# Patient Record
Sex: MALE | Race: WHITE
[De-identification: names, ages, dates, MRNs, and addresses within clinical notes are randomized per-mention and may not be internally consistent; named-entity substitution may affect disease eponyms.]

---

## 2017-05-20 ENCOUNTER — HOSPITAL ENCOUNTER (INPATIENT)
Dept: HOSPITAL 8 - ED | Age: 67
LOS: 6 days | Discharge: HOME | DRG: 853 | End: 2017-05-26
Attending: INTERNAL MEDICINE | Admitting: INTERNAL MEDICINE
Payer: MEDICARE

## 2017-05-20 VITALS — DIASTOLIC BLOOD PRESSURE: 86 MMHG | SYSTOLIC BLOOD PRESSURE: 138 MMHG

## 2017-05-20 VITALS — BODY MASS INDEX: 33.18 KG/M2 | HEIGHT: 72 IN | WEIGHT: 244.93 LBS

## 2017-05-20 VITALS — SYSTOLIC BLOOD PRESSURE: 142 MMHG | DIASTOLIC BLOOD PRESSURE: 85 MMHG

## 2017-05-20 DIAGNOSIS — N40.0: ICD-10-CM

## 2017-05-20 DIAGNOSIS — K21.9: ICD-10-CM

## 2017-05-20 DIAGNOSIS — M47.812: ICD-10-CM

## 2017-05-20 DIAGNOSIS — F17.200: ICD-10-CM

## 2017-05-20 DIAGNOSIS — Z87.442: ICD-10-CM

## 2017-05-20 DIAGNOSIS — E78.5: ICD-10-CM

## 2017-05-20 DIAGNOSIS — E43: ICD-10-CM

## 2017-05-20 DIAGNOSIS — Z79.899: ICD-10-CM

## 2017-05-20 DIAGNOSIS — G89.29: ICD-10-CM

## 2017-05-20 DIAGNOSIS — E78.00: ICD-10-CM

## 2017-05-20 DIAGNOSIS — I10: ICD-10-CM

## 2017-05-20 DIAGNOSIS — N39.0: ICD-10-CM

## 2017-05-20 DIAGNOSIS — M54.5: ICD-10-CM

## 2017-05-20 DIAGNOSIS — M50.30: ICD-10-CM

## 2017-05-20 DIAGNOSIS — E66.9: ICD-10-CM

## 2017-05-20 DIAGNOSIS — L02.11: ICD-10-CM

## 2017-05-20 DIAGNOSIS — D63.8: ICD-10-CM

## 2017-05-20 DIAGNOSIS — Z88.0: ICD-10-CM

## 2017-05-20 DIAGNOSIS — G62.9: ICD-10-CM

## 2017-05-20 DIAGNOSIS — A41.01: Primary | ICD-10-CM

## 2017-05-20 LAB
AST SERPL-CCNC: 11 U/L (ref 15–37)
BUN SERPL-MCNC: 16 MG/DL (ref 7–18)

## 2017-05-20 PROCEDURE — 85610 PROTHROMBIN TIME: CPT

## 2017-05-20 PROCEDURE — 87040 BLOOD CULTURE FOR BACTERIA: CPT

## 2017-05-20 PROCEDURE — 87086 URINE CULTURE/COLONY COUNT: CPT

## 2017-05-20 PROCEDURE — 87205 SMEAR GRAM STAIN: CPT

## 2017-05-20 PROCEDURE — 87070 CULTURE OTHR SPECIMN AEROBIC: CPT

## 2017-05-20 PROCEDURE — 80048 BASIC METABOLIC PNL TOTAL CA: CPT

## 2017-05-20 PROCEDURE — 81001 URINALYSIS AUTO W/SCOPE: CPT

## 2017-05-20 PROCEDURE — 87186 SC STD MICRODIL/AGAR DIL: CPT

## 2017-05-20 PROCEDURE — 36569 INSJ PICC 5 YR+ W/O IMAGING: CPT

## 2017-05-20 PROCEDURE — 36415 COLL VENOUS BLD VENIPUNCTURE: CPT

## 2017-05-20 PROCEDURE — 86140 C-REACTIVE PROTEIN: CPT

## 2017-05-20 PROCEDURE — 84443 ASSAY THYROID STIM HORMONE: CPT

## 2017-05-20 PROCEDURE — 87075 CULTR BACTERIA EXCEPT BLOOD: CPT

## 2017-05-20 PROCEDURE — 99285 EMERGENCY DEPT VISIT HI MDM: CPT

## 2017-05-20 PROCEDURE — C1751 CATH, INF, PER/CENT/MIDLINE: HCPCS

## 2017-05-20 PROCEDURE — 76937 US GUIDE VASCULAR ACCESS: CPT

## 2017-05-20 PROCEDURE — 74000: CPT

## 2017-05-20 PROCEDURE — 0W960ZZ DRAINAGE OF NECK, OPEN APPROACH: ICD-10-PCS | Performed by: OTOLARYNGOLOGY

## 2017-05-20 PROCEDURE — 77001 FLUOROGUIDE FOR VEIN DEVICE: CPT

## 2017-05-20 PROCEDURE — 93306 TTE W/DOPPLER COMPLETE: CPT

## 2017-05-20 PROCEDURE — 85025 COMPLETE CBC W/AUTO DIFF WBC: CPT

## 2017-05-20 PROCEDURE — 80053 COMPREHEN METABOLIC PANEL: CPT

## 2017-05-20 PROCEDURE — 85651 RBC SED RATE NONAUTOMATED: CPT

## 2017-05-20 PROCEDURE — 87077 CULTURE AEROBIC IDENTIFY: CPT

## 2017-05-20 PROCEDURE — 87150 DNA/RNA AMPLIFIED PROBE: CPT

## 2017-05-20 RX ADMIN — METRONIDAZOLE SCH MLS/HR: 500 INJECTION, SOLUTION INTRAVENOUS at 15:58

## 2017-05-20 RX ADMIN — GABAPENTIN SCH MG: 100 CAPSULE ORAL at 23:11

## 2017-05-20 RX ADMIN — HYDROMORPHONE HYDROCHLORIDE PRN MG: 1 INJECTION, SOLUTION INTRAMUSCULAR; INTRAVENOUS; SUBCUTANEOUS at 14:58

## 2017-05-20 RX ADMIN — ENOXAPARIN SODIUM SCH MG: 40 INJECTION SUBCUTANEOUS at 23:13

## 2017-05-20 RX ADMIN — METOPROLOL TARTRATE SCH MG: 25 TABLET, FILM COATED ORAL at 17:56

## 2017-05-20 RX ADMIN — LISINOPRIL SCH MG: 20 TABLET ORAL at 09:00

## 2017-05-20 RX ADMIN — HYDROMORPHONE HYDROCHLORIDE PRN MG: 1 INJECTION, SOLUTION INTRAMUSCULAR; INTRAVENOUS; SUBCUTANEOUS at 18:42

## 2017-05-20 RX ADMIN — DIAZEPAM SCH MG: 10 TABLET ORAL at 12:43

## 2017-05-20 RX ADMIN — DUTASTERIDE SCH MG: 0.5 CAPSULE ORAL at 13:45

## 2017-05-20 RX ADMIN — EZETIMIBE SCH MG: 10 TABLET ORAL at 13:44

## 2017-05-20 RX ADMIN — TAMSULOSIN HYDROCHLORIDE SCH MG: 0.4 CAPSULE ORAL at 13:44

## 2017-05-20 RX ADMIN — CLINDAMYCIN IN 5 PERCENT DEXTROSE SCH MLS/HR: 18 INJECTION, SOLUTION INTRAVENOUS at 13:44

## 2017-05-20 RX ADMIN — METOPROLOL TARTRATE SCH MG: 25 TABLET, FILM COATED ORAL at 09:00

## 2017-05-20 RX ADMIN — DIAZEPAM SCH MG: 10 TABLET ORAL at 23:12

## 2017-05-20 RX ADMIN — BISACODYL PRN MG: 10 SUPPOSITORY RECTAL at 23:15

## 2017-05-20 RX ADMIN — HYDROMORPHONE HYDROCHLORIDE PRN MG: 1 INJECTION, SOLUTION INTRAMUSCULAR; INTRAVENOUS; SUBCUTANEOUS at 13:04

## 2017-05-20 RX ADMIN — METRONIDAZOLE SCH MLS/HR: 500 INJECTION, SOLUTION INTRAVENOUS at 08:30

## 2017-05-20 RX ADMIN — GABAPENTIN SCH MG: 100 CAPSULE ORAL at 13:44

## 2017-05-20 RX ADMIN — GABAPENTIN SCH MG: 100 CAPSULE ORAL at 15:58

## 2017-05-20 RX ADMIN — PANTOPRAZOLE SODIUM SCH MG: 40 TABLET, DELAYED RELEASE ORAL at 13:45

## 2017-05-20 RX ADMIN — KETOROLAC TROMETHAMINE PRN MG: 30 INJECTION, SOLUTION INTRAMUSCULAR at 20:53

## 2017-05-20 RX ADMIN — CEFTRIAXONE SCH MLS/HR: 2 INJECTION, POWDER, FOR SOLUTION INTRAMUSCULAR; INTRAVENOUS at 08:30

## 2017-05-20 RX ADMIN — SODIUM CHLORIDE SCH MLS/HR: 0.9 INJECTION, SOLUTION INTRAVENOUS at 12:43

## 2017-05-20 RX ADMIN — CLINDAMYCIN IN 5 PERCENT DEXTROSE SCH MLS/HR: 18 INJECTION, SOLUTION INTRAVENOUS at 19:45

## 2017-05-20 RX ADMIN — CEFTRIAXONE SCH MLS/HR: 2 INJECTION, POWDER, FOR SOLUTION INTRAMUSCULAR; INTRAVENOUS at 20:53

## 2017-05-21 VITALS — DIASTOLIC BLOOD PRESSURE: 68 MMHG | SYSTOLIC BLOOD PRESSURE: 112 MMHG

## 2017-05-21 VITALS — DIASTOLIC BLOOD PRESSURE: 58 MMHG | SYSTOLIC BLOOD PRESSURE: 97 MMHG

## 2017-05-21 VITALS — SYSTOLIC BLOOD PRESSURE: 115 MMHG | DIASTOLIC BLOOD PRESSURE: 79 MMHG

## 2017-05-21 VITALS — SYSTOLIC BLOOD PRESSURE: 110 MMHG | DIASTOLIC BLOOD PRESSURE: 64 MMHG

## 2017-05-21 VITALS — DIASTOLIC BLOOD PRESSURE: 60 MMHG | SYSTOLIC BLOOD PRESSURE: 100 MMHG

## 2017-05-21 LAB
AST SERPL-CCNC: 12 U/L (ref 15–37)
BUN SERPL-MCNC: 16 MG/DL (ref 7–18)
PATH.CAST-FLAG: (no result)
SPERM-FLAG: (no result)
SRC-FLAG: (no result)
XTAL-FLAG: (no result)
YLC-FLAG: (no result)

## 2017-05-21 RX ADMIN — METOPROLOL TARTRATE SCH MG: 25 TABLET, FILM COATED ORAL at 06:14

## 2017-05-21 RX ADMIN — TEMAZEPAM PRN MG: 15 CAPSULE ORAL at 21:58

## 2017-05-21 RX ADMIN — CLINDAMYCIN IN 5 PERCENT DEXTROSE SCH MLS/HR: 18 INJECTION, SOLUTION INTRAVENOUS at 21:00

## 2017-05-21 RX ADMIN — VANCOMYCIN HYDROCHLORIDE SCH MLS/HR: 1 INJECTION, POWDER, LYOPHILIZED, FOR SOLUTION INTRAVENOUS at 04:51

## 2017-05-21 RX ADMIN — DIAZEPAM SCH MG: 10 TABLET ORAL at 00:53

## 2017-05-21 RX ADMIN — ENOXAPARIN SODIUM SCH MG: 40 INJECTION SUBCUTANEOUS at 23:00

## 2017-05-21 RX ADMIN — SODIUM CHLORIDE SCH MLS/HR: 0.9 INJECTION, SOLUTION INTRAVENOUS at 02:15

## 2017-05-21 RX ADMIN — VANCOMYCIN HYDROCHLORIDE SCH MLS/HR: 1 INJECTION, POWDER, LYOPHILIZED, FOR SOLUTION INTRAVENOUS at 21:54

## 2017-05-21 RX ADMIN — CEFTRIAXONE SCH MLS/HR: 2 INJECTION, POWDER, FOR SOLUTION INTRAMUSCULAR; INTRAVENOUS at 08:30

## 2017-05-21 RX ADMIN — LACTOBACILLUS TAB SCH TAB: TAB at 21:50

## 2017-05-21 RX ADMIN — OXYCODONE HYDROCHLORIDE AND ACETAMINOPHEN PRN TAB: 10; 325 TABLET ORAL at 19:51

## 2017-05-21 RX ADMIN — EZETIMIBE SCH MG: 10 TABLET ORAL at 07:54

## 2017-05-21 RX ADMIN — GABAPENTIN SCH MG: 100 CAPSULE ORAL at 06:14

## 2017-05-21 RX ADMIN — PANTOPRAZOLE SODIUM SCH MG: 40 TABLET, DELAYED RELEASE ORAL at 07:55

## 2017-05-21 RX ADMIN — DIAZEPAM SCH MG: 10 TABLET ORAL at 07:58

## 2017-05-21 RX ADMIN — LISINOPRIL SCH MG: 20 TABLET ORAL at 07:55

## 2017-05-21 RX ADMIN — METOPROLOL TARTRATE SCH MG: 25 TABLET, FILM COATED ORAL at 18:00

## 2017-05-21 RX ADMIN — OXYCODONE HYDROCHLORIDE AND ACETAMINOPHEN PRN TAB: 10; 325 TABLET ORAL at 08:03

## 2017-05-21 RX ADMIN — GABAPENTIN SCH MG: 300 CAPSULE ORAL at 15:58

## 2017-05-21 RX ADMIN — LACTOBACILLUS TAB SCH TAB: TAB at 15:58

## 2017-05-21 RX ADMIN — SODIUM CHLORIDE SCH MLS/HR: 0.9 INJECTION, SOLUTION INTRAVENOUS at 21:00

## 2017-05-21 RX ADMIN — CLINDAMYCIN IN 5 PERCENT DEXTROSE SCH MLS/HR: 18 INJECTION, SOLUTION INTRAVENOUS at 02:15

## 2017-05-21 RX ADMIN — GABAPENTIN SCH MG: 300 CAPSULE ORAL at 20:06

## 2017-05-21 RX ADMIN — OXYCODONE HYDROCHLORIDE AND ACETAMINOPHEN PRN TAB: 10; 325 TABLET ORAL at 23:52

## 2017-05-21 RX ADMIN — METRONIDAZOLE SCH MLS/HR: 500 INJECTION, SOLUTION INTRAVENOUS at 10:35

## 2017-05-21 RX ADMIN — DIAZEPAM PRN MG: 10 TABLET ORAL at 21:50

## 2017-05-21 RX ADMIN — LACTOBACILLUS TAB SCH TAB: TAB at 11:42

## 2017-05-21 RX ADMIN — BACITRACIN ZINC AND POLYMYXIN B SULFATE SCH APPLIC: 500; 10000 OINTMENT TOPICAL at 11:42

## 2017-05-21 RX ADMIN — CLINDAMYCIN IN 5 PERCENT DEXTROSE SCH MLS/HR: 18 INJECTION, SOLUTION INTRAVENOUS at 14:57

## 2017-05-21 RX ADMIN — TAMSULOSIN HYDROCHLORIDE SCH MG: 0.4 CAPSULE ORAL at 07:55

## 2017-05-21 RX ADMIN — CLINDAMYCIN IN 5 PERCENT DEXTROSE SCH MLS/HR: 18 INJECTION, SOLUTION INTRAVENOUS at 08:45

## 2017-05-21 RX ADMIN — OXYCODONE HYDROCHLORIDE AND ACETAMINOPHEN PRN TAB: 10; 325 TABLET ORAL at 15:57

## 2017-05-21 RX ADMIN — DIAZEPAM PRN MG: 10 TABLET ORAL at 15:57

## 2017-05-21 RX ADMIN — METRONIDAZOLE SCH MLS/HR: 500 INJECTION, SOLUTION INTRAVENOUS at 00:30

## 2017-05-21 RX ADMIN — DUTASTERIDE SCH MG: 0.5 CAPSULE ORAL at 07:54

## 2017-05-21 RX ADMIN — TEMAZEPAM PRN MG: 15 CAPSULE ORAL at 03:56

## 2017-05-21 RX ADMIN — OXYCODONE HYDROCHLORIDE AND ACETAMINOPHEN PRN TAB: 10; 325 TABLET ORAL at 11:58

## 2017-05-21 RX ADMIN — GABAPENTIN SCH MG: 100 CAPSULE ORAL at 10:35

## 2017-05-22 VITALS — SYSTOLIC BLOOD PRESSURE: 124 MMHG | DIASTOLIC BLOOD PRESSURE: 86 MMHG

## 2017-05-22 VITALS — DIASTOLIC BLOOD PRESSURE: 70 MMHG | SYSTOLIC BLOOD PRESSURE: 119 MMHG

## 2017-05-22 VITALS — SYSTOLIC BLOOD PRESSURE: 140 MMHG | DIASTOLIC BLOOD PRESSURE: 83 MMHG

## 2017-05-22 VITALS — DIASTOLIC BLOOD PRESSURE: 70 MMHG | SYSTOLIC BLOOD PRESSURE: 116 MMHG

## 2017-05-22 LAB — BUN SERPL-MCNC: 21 MG/DL (ref 7–18)

## 2017-05-22 RX ADMIN — OXYCODONE HYDROCHLORIDE AND ACETAMINOPHEN PRN TAB: 10; 325 TABLET ORAL at 08:50

## 2017-05-22 RX ADMIN — GABAPENTIN SCH MG: 300 CAPSULE ORAL at 17:28

## 2017-05-22 RX ADMIN — TAMSULOSIN HYDROCHLORIDE SCH MG: 0.4 CAPSULE ORAL at 08:51

## 2017-05-22 RX ADMIN — LISINOPRIL SCH MG: 20 TABLET ORAL at 08:52

## 2017-05-22 RX ADMIN — CLINDAMYCIN IN 5 PERCENT DEXTROSE SCH MLS/HR: 18 INJECTION, SOLUTION INTRAVENOUS at 03:06

## 2017-05-22 RX ADMIN — CEFAZOLIN SCH MLS/HR: 1 INJECTION, POWDER, FOR SOLUTION INTRAVENOUS at 14:58

## 2017-05-22 RX ADMIN — METOPROLOL TARTRATE SCH MG: 25 TABLET, FILM COATED ORAL at 18:05

## 2017-05-22 RX ADMIN — CLINDAMYCIN IN 5 PERCENT DEXTROSE SCH MLS/HR: 18 INJECTION, SOLUTION INTRAVENOUS at 08:54

## 2017-05-22 RX ADMIN — DIAZEPAM PRN MG: 10 TABLET ORAL at 19:38

## 2017-05-22 RX ADMIN — HYDROMORPHONE HYDROCHLORIDE PRN MG: 1 INJECTION, SOLUTION INTRAMUSCULAR; INTRAVENOUS; SUBCUTANEOUS at 21:49

## 2017-05-22 RX ADMIN — DUTASTERIDE SCH MG: 0.5 CAPSULE ORAL at 08:51

## 2017-05-22 RX ADMIN — HYDROMORPHONE HYDROCHLORIDE PRN MG: 1 INJECTION, SOLUTION INTRAMUSCULAR; INTRAVENOUS; SUBCUTANEOUS at 19:39

## 2017-05-22 RX ADMIN — GABAPENTIN SCH MG: 300 CAPSULE ORAL at 06:00

## 2017-05-22 RX ADMIN — DIAZEPAM PRN MG: 10 TABLET ORAL at 10:48

## 2017-05-22 RX ADMIN — CEFAZOLIN SCH MLS/HR: 1 INJECTION, POWDER, FOR SOLUTION INTRAVENOUS at 21:50

## 2017-05-22 RX ADMIN — SODIUM CHLORIDE SCH MLS/HR: 0.9 INJECTION, SOLUTION INTRAVENOUS at 17:28

## 2017-05-22 RX ADMIN — GABAPENTIN SCH MG: 300 CAPSULE ORAL at 10:48

## 2017-05-22 RX ADMIN — KETOROLAC TROMETHAMINE PRN MG: 30 INJECTION, SOLUTION INTRAMUSCULAR at 21:49

## 2017-05-22 RX ADMIN — EZETIMIBE SCH MG: 10 TABLET ORAL at 08:52

## 2017-05-22 RX ADMIN — ENOXAPARIN SODIUM SCH MG: 40 INJECTION SUBCUTANEOUS at 21:52

## 2017-05-22 RX ADMIN — TEMAZEPAM PRN MG: 15 CAPSULE ORAL at 22:16

## 2017-05-22 RX ADMIN — LACTOBACILLUS TAB SCH TAB: TAB at 08:51

## 2017-05-22 RX ADMIN — OXYCODONE HYDROCHLORIDE AND ACETAMINOPHEN PRN TAB: 10; 325 TABLET ORAL at 23:55

## 2017-05-22 RX ADMIN — OXYCODONE HYDROCHLORIDE AND ACETAMINOPHEN PRN TAB: 10; 325 TABLET ORAL at 04:17

## 2017-05-22 RX ADMIN — GABAPENTIN SCH MG: 300 CAPSULE ORAL at 21:52

## 2017-05-22 RX ADMIN — LACTOBACILLUS TAB SCH TAB: TAB at 21:52

## 2017-05-22 RX ADMIN — METOPROLOL TARTRATE SCH MG: 25 TABLET, FILM COATED ORAL at 06:18

## 2017-05-22 RX ADMIN — PANTOPRAZOLE SODIUM SCH MG: 40 TABLET, DELAYED RELEASE ORAL at 08:52

## 2017-05-22 RX ADMIN — DIAZEPAM PRN MG: 10 TABLET ORAL at 04:18

## 2017-05-22 RX ADMIN — LACTOBACILLUS TAB SCH TAB: TAB at 17:28

## 2017-05-23 VITALS — DIASTOLIC BLOOD PRESSURE: 80 MMHG | SYSTOLIC BLOOD PRESSURE: 140 MMHG

## 2017-05-23 VITALS — DIASTOLIC BLOOD PRESSURE: 85 MMHG | SYSTOLIC BLOOD PRESSURE: 146 MMHG

## 2017-05-23 VITALS — DIASTOLIC BLOOD PRESSURE: 84 MMHG | SYSTOLIC BLOOD PRESSURE: 132 MMHG

## 2017-05-23 LAB — BUN SERPL-MCNC: 17 MG/DL (ref 7–18)

## 2017-05-23 RX ADMIN — METOPROLOL TARTRATE SCH MG: 25 TABLET, FILM COATED ORAL at 06:46

## 2017-05-23 RX ADMIN — PANTOPRAZOLE SODIUM SCH MG: 40 TABLET, DELAYED RELEASE ORAL at 09:00

## 2017-05-23 RX ADMIN — CEFAZOLIN SCH MLS/HR: 1 INJECTION, POWDER, FOR SOLUTION INTRAVENOUS at 22:18

## 2017-05-23 RX ADMIN — BISACODYL PRN MG: 10 SUPPOSITORY RECTAL at 09:13

## 2017-05-23 RX ADMIN — OXYCODONE HYDROCHLORIDE AND ACETAMINOPHEN PRN TAB: 10; 325 TABLET ORAL at 15:50

## 2017-05-23 RX ADMIN — GABAPENTIN SCH MG: 400 CAPSULE ORAL at 20:09

## 2017-05-23 RX ADMIN — DIAZEPAM PRN MG: 10 TABLET ORAL at 17:22

## 2017-05-23 RX ADMIN — DIAZEPAM PRN MG: 10 TABLET ORAL at 22:17

## 2017-05-23 RX ADMIN — EZETIMIBE SCH MG: 10 TABLET ORAL at 09:12

## 2017-05-23 RX ADMIN — LISINOPRIL SCH MG: 20 TABLET ORAL at 09:12

## 2017-05-23 RX ADMIN — LACTOBACILLUS TAB SCH TAB: TAB at 09:12

## 2017-05-23 RX ADMIN — CEFAZOLIN SCH MLS/HR: 1 INJECTION, POWDER, FOR SOLUTION INTRAVENOUS at 06:46

## 2017-05-23 RX ADMIN — ENOXAPARIN SODIUM SCH MG: 40 INJECTION SUBCUTANEOUS at 22:18

## 2017-05-23 RX ADMIN — OXYCODONE HYDROCHLORIDE AND ACETAMINOPHEN PRN TAB: 10; 325 TABLET ORAL at 06:50

## 2017-05-23 RX ADMIN — HYDROMORPHONE HYDROCHLORIDE PRN MG: 1 INJECTION, SOLUTION INTRAMUSCULAR; INTRAVENOUS; SUBCUTANEOUS at 22:31

## 2017-05-23 RX ADMIN — OXYCODONE HYDROCHLORIDE AND ACETAMINOPHEN PRN TAB: 10; 325 TABLET ORAL at 20:09

## 2017-05-23 RX ADMIN — SODIUM CHLORIDE SCH MLS/HR: 0.9 INJECTION, SOLUTION INTRAVENOUS at 06:46

## 2017-05-23 RX ADMIN — SODIUM CHLORIDE SCH MLS/HR: 0.9 INJECTION, SOLUTION INTRAVENOUS at 17:23

## 2017-05-23 RX ADMIN — LACTOBACILLUS TAB SCH TAB: TAB at 20:09

## 2017-05-23 RX ADMIN — GABAPENTIN SCH MG: 300 CAPSULE ORAL at 06:46

## 2017-05-23 RX ADMIN — TAMSULOSIN HYDROCHLORIDE SCH MG: 0.4 CAPSULE ORAL at 09:11

## 2017-05-23 RX ADMIN — GABAPENTIN SCH MG: 300 CAPSULE ORAL at 15:50

## 2017-05-23 RX ADMIN — CEFAZOLIN SCH MLS/HR: 1 INJECTION, POWDER, FOR SOLUTION INTRAVENOUS at 14:40

## 2017-05-23 RX ADMIN — LACTOBACILLUS TAB SCH TAB: TAB at 15:50

## 2017-05-23 RX ADMIN — DIAZEPAM PRN MG: 10 TABLET ORAL at 06:51

## 2017-05-23 RX ADMIN — GABAPENTIN SCH MG: 300 CAPSULE ORAL at 11:00

## 2017-05-23 RX ADMIN — DUTASTERIDE SCH MG: 0.5 CAPSULE ORAL at 09:12

## 2017-05-23 RX ADMIN — METOPROLOL TARTRATE SCH MG: 25 TABLET, FILM COATED ORAL at 17:22

## 2017-05-23 RX ADMIN — BACITRACIN ZINC AND POLYMYXIN B SULFATE SCH APPLIC: 500; 10000 OINTMENT TOPICAL at 12:04

## 2017-05-24 VITALS — DIASTOLIC BLOOD PRESSURE: 67 MMHG | SYSTOLIC BLOOD PRESSURE: 117 MMHG

## 2017-05-24 VITALS — SYSTOLIC BLOOD PRESSURE: 132 MMHG | DIASTOLIC BLOOD PRESSURE: 70 MMHG

## 2017-05-24 VITALS — SYSTOLIC BLOOD PRESSURE: 121 MMHG | DIASTOLIC BLOOD PRESSURE: 75 MMHG

## 2017-05-24 VITALS — SYSTOLIC BLOOD PRESSURE: 134 MMHG | DIASTOLIC BLOOD PRESSURE: 89 MMHG

## 2017-05-24 VITALS — SYSTOLIC BLOOD PRESSURE: 141 MMHG | DIASTOLIC BLOOD PRESSURE: 80 MMHG

## 2017-05-24 RX ADMIN — CEFAZOLIN SODIUM SCH MLS/HR: 2 SOLUTION INTRAVENOUS at 21:03

## 2017-05-24 RX ADMIN — PANTOPRAZOLE SODIUM SCH MG: 40 TABLET, DELAYED RELEASE ORAL at 08:36

## 2017-05-24 RX ADMIN — TAMSULOSIN HYDROCHLORIDE SCH MG: 0.4 CAPSULE ORAL at 08:36

## 2017-05-24 RX ADMIN — OXYCODONE HYDROCHLORIDE AND ACETAMINOPHEN PRN TAB: 10; 325 TABLET ORAL at 05:42

## 2017-05-24 RX ADMIN — EZETIMIBE SCH MG: 10 TABLET ORAL at 08:37

## 2017-05-24 RX ADMIN — METOPROLOL TARTRATE SCH MG: 25 TABLET, FILM COATED ORAL at 17:36

## 2017-05-24 RX ADMIN — GABAPENTIN SCH MG: 400 CAPSULE ORAL at 15:40

## 2017-05-24 RX ADMIN — DIAZEPAM PRN MG: 10 TABLET ORAL at 05:42

## 2017-05-24 RX ADMIN — GABAPENTIN SCH MG: 400 CAPSULE ORAL at 21:03

## 2017-05-24 RX ADMIN — DUTASTERIDE SCH MG: 0.5 CAPSULE ORAL at 08:37

## 2017-05-24 RX ADMIN — GABAPENTIN SCH MG: 400 CAPSULE ORAL at 12:32

## 2017-05-24 RX ADMIN — OXYCODONE HYDROCHLORIDE AND ACETAMINOPHEN PRN TAB: 10; 325 TABLET ORAL at 00:39

## 2017-05-24 RX ADMIN — BACITRACIN ZINC AND POLYMYXIN B SULFATE SCH APPLIC: 500; 10000 OINTMENT TOPICAL at 15:40

## 2017-05-24 RX ADMIN — LISINOPRIL SCH MG: 20 TABLET ORAL at 08:43

## 2017-05-24 RX ADMIN — ENOXAPARIN SODIUM SCH MG: 40 INJECTION SUBCUTANEOUS at 21:04

## 2017-05-24 RX ADMIN — OXYCODONE HYDROCHLORIDE AND ACETAMINOPHEN PRN TAB: 10; 325 TABLET ORAL at 10:44

## 2017-05-24 RX ADMIN — OXYCODONE HYDROCHLORIDE AND ACETAMINOPHEN PRN TAB: 10; 325 TABLET ORAL at 23:50

## 2017-05-24 RX ADMIN — LACTOBACILLUS TAB SCH TAB: TAB at 21:03

## 2017-05-24 RX ADMIN — CEFAZOLIN SCH MLS/HR: 1 INJECTION, POWDER, FOR SOLUTION INTRAVENOUS at 05:41

## 2017-05-24 RX ADMIN — GABAPENTIN SCH MG: 400 CAPSULE ORAL at 05:41

## 2017-05-24 RX ADMIN — LACTOBACILLUS TAB SCH TAB: TAB at 15:40

## 2017-05-24 RX ADMIN — LACTOBACILLUS TAB SCH TAB: TAB at 08:36

## 2017-05-24 RX ADMIN — CEFAZOLIN SODIUM SCH MLS/HR: 2 SOLUTION INTRAVENOUS at 14:16

## 2017-05-24 RX ADMIN — METOPROLOL TARTRATE SCH MG: 25 TABLET, FILM COATED ORAL at 05:42

## 2017-05-24 RX ADMIN — SODIUM CHLORIDE SCH MLS/HR: 0.9 INJECTION, SOLUTION INTRAVENOUS at 15:39

## 2017-05-24 RX ADMIN — SODIUM CHLORIDE SCH MLS/HR: 0.9 INJECTION, SOLUTION INTRAVENOUS at 05:43

## 2017-05-24 RX ADMIN — OXYCODONE HYDROCHLORIDE AND ACETAMINOPHEN PRN TAB: 10; 325 TABLET ORAL at 18:46

## 2017-05-25 VITALS — SYSTOLIC BLOOD PRESSURE: 122 MMHG | DIASTOLIC BLOOD PRESSURE: 75 MMHG

## 2017-05-25 VITALS — SYSTOLIC BLOOD PRESSURE: 133 MMHG | DIASTOLIC BLOOD PRESSURE: 90 MMHG

## 2017-05-25 VITALS — SYSTOLIC BLOOD PRESSURE: 136 MMHG | DIASTOLIC BLOOD PRESSURE: 77 MMHG

## 2017-05-25 VITALS — DIASTOLIC BLOOD PRESSURE: 82 MMHG | SYSTOLIC BLOOD PRESSURE: 120 MMHG

## 2017-05-25 VITALS — SYSTOLIC BLOOD PRESSURE: 138 MMHG | DIASTOLIC BLOOD PRESSURE: 83 MMHG

## 2017-05-25 PROCEDURE — 02HV33Z INSERTION OF INFUSION DEVICE INTO SUPERIOR VENA CAVA, PERCUTANEOUS APPROACH: ICD-10-PCS | Performed by: RADIOLOGY

## 2017-05-25 PROCEDURE — B548ZZA ULTRASONOGRAPHY OF SUPERIOR VENA CAVA, GUIDANCE: ICD-10-PCS | Performed by: RADIOLOGY

## 2017-05-25 PROCEDURE — B5181ZA FLUOROSCOPY OF SUPERIOR VENA CAVA USING LOW OSMOLAR CONTRAST, GUIDANCE: ICD-10-PCS | Performed by: RADIOLOGY

## 2017-05-25 RX ADMIN — CEFAZOLIN SODIUM SCH MLS/HR: 2 SOLUTION INTRAVENOUS at 05:22

## 2017-05-25 RX ADMIN — SODIUM CHLORIDE SCH MLS/HR: 0.9 INJECTION, SOLUTION INTRAVENOUS at 12:00

## 2017-05-25 RX ADMIN — OXYCODONE HYDROCHLORIDE AND ACETAMINOPHEN PRN TAB: 10; 325 TABLET ORAL at 23:39

## 2017-05-25 RX ADMIN — GABAPENTIN SCH MG: 400 CAPSULE ORAL at 11:34

## 2017-05-25 RX ADMIN — SODIUM CHLORIDE SCH MLS/HR: 0.9 INJECTION, SOLUTION INTRAVENOUS at 22:00

## 2017-05-25 RX ADMIN — GABAPENTIN SCH MG: 400 CAPSULE ORAL at 06:36

## 2017-05-25 RX ADMIN — SODIUM CHLORIDE SCH MLS/HR: 0.9 INJECTION, SOLUTION INTRAVENOUS at 05:22

## 2017-05-25 RX ADMIN — LACTOBACILLUS TAB SCH TAB: TAB at 21:17

## 2017-05-25 RX ADMIN — TAMSULOSIN HYDROCHLORIDE SCH MG: 0.4 CAPSULE ORAL at 09:31

## 2017-05-25 RX ADMIN — GABAPENTIN SCH MG: 400 CAPSULE ORAL at 21:17

## 2017-05-25 RX ADMIN — LISINOPRIL SCH MG: 20 TABLET ORAL at 09:32

## 2017-05-25 RX ADMIN — DUTASTERIDE SCH MG: 0.5 CAPSULE ORAL at 09:31

## 2017-05-25 RX ADMIN — HYDROMORPHONE HYDROCHLORIDE PRN MG: 1 INJECTION, SOLUTION INTRAMUSCULAR; INTRAVENOUS; SUBCUTANEOUS at 23:19

## 2017-05-25 RX ADMIN — LACTOBACILLUS TAB SCH TAB: TAB at 09:31

## 2017-05-25 RX ADMIN — METOPROLOL TARTRATE SCH MG: 25 TABLET, FILM COATED ORAL at 17:46

## 2017-05-25 RX ADMIN — ENOXAPARIN SODIUM SCH MG: 40 INJECTION SUBCUTANEOUS at 21:22

## 2017-05-25 RX ADMIN — CEFAZOLIN SODIUM SCH MLS/HR: 2 SOLUTION INTRAVENOUS at 13:37

## 2017-05-25 RX ADMIN — LACTOBACILLUS TAB SCH TAB: TAB at 16:41

## 2017-05-25 RX ADMIN — EZETIMIBE SCH MG: 10 TABLET ORAL at 09:30

## 2017-05-25 RX ADMIN — GABAPENTIN SCH MG: 400 CAPSULE ORAL at 16:40

## 2017-05-25 RX ADMIN — PANTOPRAZOLE SODIUM SCH MG: 40 TABLET, DELAYED RELEASE ORAL at 09:32

## 2017-05-25 RX ADMIN — BACITRACIN ZINC AND POLYMYXIN B SULFATE SCH APPLIC: 500; 10000 OINTMENT TOPICAL at 11:34

## 2017-05-25 RX ADMIN — OXYCODONE HYDROCHLORIDE AND ACETAMINOPHEN PRN TAB: 10; 325 TABLET ORAL at 12:57

## 2017-05-25 RX ADMIN — METOPROLOL TARTRATE SCH MG: 25 TABLET, FILM COATED ORAL at 06:47

## 2017-05-25 RX ADMIN — CEFAZOLIN SODIUM SCH MLS/HR: 2 SOLUTION INTRAVENOUS at 21:17

## 2017-05-25 RX ADMIN — DIAZEPAM PRN MG: 10 TABLET ORAL at 13:36

## 2017-05-25 RX ADMIN — DIAZEPAM PRN MG: 10 TABLET ORAL at 21:27

## 2017-05-25 RX ADMIN — OXYCODONE HYDROCHLORIDE AND ACETAMINOPHEN PRN TAB: 10; 325 TABLET ORAL at 19:42

## 2017-05-26 VITALS — SYSTOLIC BLOOD PRESSURE: 115 MMHG | DIASTOLIC BLOOD PRESSURE: 72 MMHG

## 2017-05-26 VITALS — DIASTOLIC BLOOD PRESSURE: 76 MMHG | SYSTOLIC BLOOD PRESSURE: 117 MMHG

## 2017-05-26 VITALS — DIASTOLIC BLOOD PRESSURE: 88 MMHG | SYSTOLIC BLOOD PRESSURE: 122 MMHG

## 2017-05-26 VITALS — SYSTOLIC BLOOD PRESSURE: 118 MMHG | DIASTOLIC BLOOD PRESSURE: 75 MMHG

## 2017-05-26 RX ADMIN — CEFAZOLIN SODIUM SCH MLS/HR: 2 SOLUTION INTRAVENOUS at 05:49

## 2017-05-26 RX ADMIN — SODIUM CHLORIDE SCH MLS/HR: 0.9 INJECTION, SOLUTION INTRAVENOUS at 07:55

## 2017-05-26 RX ADMIN — LISINOPRIL SCH MG: 20 TABLET ORAL at 08:01

## 2017-05-26 RX ADMIN — BACITRACIN ZINC AND POLYMYXIN B SULFATE SCH APPLIC: 500; 10000 OINTMENT TOPICAL at 07:59

## 2017-05-26 RX ADMIN — OXYCODONE HYDROCHLORIDE AND ACETAMINOPHEN PRN TAB: 10; 325 TABLET ORAL at 07:59

## 2017-05-26 RX ADMIN — PANTOPRAZOLE SODIUM SCH MG: 40 TABLET, DELAYED RELEASE ORAL at 08:02

## 2017-05-26 RX ADMIN — METOPROLOL TARTRATE SCH MG: 25 TABLET, FILM COATED ORAL at 06:19

## 2017-05-26 RX ADMIN — GABAPENTIN SCH MG: 400 CAPSULE ORAL at 06:19

## 2017-05-26 RX ADMIN — EZETIMIBE SCH MG: 10 TABLET ORAL at 08:03

## 2017-05-26 RX ADMIN — DUTASTERIDE SCH MG: 0.5 CAPSULE ORAL at 07:59

## 2017-05-26 RX ADMIN — LACTOBACILLUS TAB SCH TAB: TAB at 08:00

## 2017-05-26 RX ADMIN — TAMSULOSIN HYDROCHLORIDE SCH MG: 0.4 CAPSULE ORAL at 08:00

## 2017-05-26 RX ADMIN — OXYCODONE HYDROCHLORIDE AND ACETAMINOPHEN PRN TAB: 10; 325 TABLET ORAL at 03:44

## 2020-09-29 ENCOUNTER — APPOINTMENT (RX ONLY)
Dept: URBAN - METROPOLITAN AREA CLINIC 38 | Facility: CLINIC | Age: 70
Setting detail: DERMATOLOGY
End: 2020-09-29

## 2020-09-29 DIAGNOSIS — L57.8 OTHER SKIN CHANGES DUE TO CHRONIC EXPOSURE TO NONIONIZING RADIATION: ICD-10-CM

## 2020-09-29 DIAGNOSIS — L81.4 OTHER MELANIN HYPERPIGMENTATION: ICD-10-CM

## 2020-09-29 DIAGNOSIS — L85.3 XEROSIS CUTIS: ICD-10-CM

## 2020-09-29 DIAGNOSIS — Z71.89 OTHER SPECIFIED COUNSELING: ICD-10-CM

## 2020-09-29 DIAGNOSIS — L57.0 ACTINIC KERATOSIS: ICD-10-CM

## 2020-09-29 DIAGNOSIS — D18.0 HEMANGIOMA: ICD-10-CM

## 2020-09-29 DIAGNOSIS — D22 MELANOCYTIC NEVI: ICD-10-CM

## 2020-09-29 DIAGNOSIS — L82.1 OTHER SEBORRHEIC KERATOSIS: ICD-10-CM

## 2020-09-29 PROBLEM — D22.9 MELANOCYTIC NEVI, UNSPECIFIED: Status: ACTIVE | Noted: 2020-09-29

## 2020-09-29 PROBLEM — D18.01 HEMANGIOMA OF SKIN AND SUBCUTANEOUS TISSUE: Status: ACTIVE | Noted: 2020-09-29

## 2020-09-29 PROCEDURE — ? PRESCRIPTION

## 2020-09-29 PROCEDURE — ? LIQUID NITROGEN

## 2020-09-29 PROCEDURE — ? COUNSELING

## 2020-09-29 PROCEDURE — 99203 OFFICE O/P NEW LOW 30 MIN: CPT | Mod: 25

## 2020-09-29 PROCEDURE — 17000 DESTRUCT PREMALG LESION: CPT

## 2020-09-29 RX ORDER — FLUOROURACIL 2 G/40G
THIN LAYER CREAM TOPICAL DAILY
Qty: 1 | Refills: 3 | Status: ERX | COMMUNITY
Start: 2020-09-29

## 2020-09-29 RX ADMIN — FLUOROURACIL THIN LAYER: 2 CREAM TOPICAL at 00:00

## 2020-09-29 ASSESSMENT — LOCATION ZONE DERM
LOCATION ZONE: LIP
LOCATION ZONE: LIP
LOCATION ZONE: ARM
LOCATION ZONE: TRUNK

## 2020-09-29 ASSESSMENT — LOCATION SIMPLE DESCRIPTION DERM
LOCATION SIMPLE: RIGHT FOREARM
LOCATION SIMPLE: ABDOMEN
LOCATION SIMPLE: RIGHT UPPER BACK
LOCATION SIMPLE: LEFT LIP
LOCATION SIMPLE: LEFT LIP
LOCATION SIMPLE: LEFT FOREARM

## 2020-09-29 ASSESSMENT — LOCATION DETAILED DESCRIPTION DERM
LOCATION DETAILED: LEFT PROXIMAL DORSAL FOREARM
LOCATION DETAILED: RIGHT INFERIOR UPPER BACK
LOCATION DETAILED: LEFT INFERIOR VERMILION LIP
LOCATION DETAILED: RIGHT PROXIMAL DORSAL FOREARM
LOCATION DETAILED: PERIUMBILICAL SKIN
LOCATION DETAILED: LEFT INFERIOR VERMILION LIP

## 2022-04-25 ENCOUNTER — APPOINTMENT (RX ONLY)
Dept: URBAN - NONMETROPOLITAN AREA CLINIC 1 | Facility: CLINIC | Age: 72
Setting detail: DERMATOLOGY
End: 2022-04-25

## 2022-04-25 DIAGNOSIS — Z85.828 PERSONAL HISTORY OF OTHER MALIGNANT NEOPLASM OF SKIN: ICD-10-CM

## 2022-04-25 DIAGNOSIS — D22 MELANOCYTIC NEVI: ICD-10-CM | Status: STABLE

## 2022-04-25 DIAGNOSIS — L82.1 OTHER SEBORRHEIC KERATOSIS: ICD-10-CM | Status: STABLE

## 2022-04-25 DIAGNOSIS — L57.0 ACTINIC KERATOSIS: ICD-10-CM

## 2022-04-25 PROBLEM — D22.5 MELANOCYTIC NEVI OF TRUNK: Status: ACTIVE | Noted: 2022-04-25

## 2022-04-25 PROCEDURE — 99203 OFFICE O/P NEW LOW 30 MIN: CPT | Mod: 25

## 2022-04-25 PROCEDURE — ? COUNSELING

## 2022-04-25 PROCEDURE — 17000 DESTRUCT PREMALG LESION: CPT

## 2022-04-25 PROCEDURE — ? LIQUID NITROGEN

## 2022-04-25 PROCEDURE — ? PHOTODYNAMIC THERAPY COUNSELING

## 2022-04-25 PROCEDURE — 17003 DESTRUCT PREMALG LES 2-14: CPT

## 2022-04-25 ASSESSMENT — LOCATION SIMPLE DESCRIPTION DERM
LOCATION SIMPLE: RIGHT FOREARM
LOCATION SIMPLE: LEFT EAR
LOCATION SIMPLE: RIGHT HAND
LOCATION SIMPLE: LEFT LIP
LOCATION SIMPLE: LEFT HAND
LOCATION SIMPLE: LEFT FOREARM
LOCATION SIMPLE: RIGHT WRIST
LOCATION SIMPLE: UPPER BACK
LOCATION SIMPLE: CHEST

## 2022-04-25 ASSESSMENT — LOCATION ZONE DERM
LOCATION ZONE: LIP
LOCATION ZONE: HAND
LOCATION ZONE: EAR
LOCATION ZONE: ARM
LOCATION ZONE: TRUNK

## 2022-04-25 ASSESSMENT — LOCATION DETAILED DESCRIPTION DERM
LOCATION DETAILED: RIGHT PROXIMAL DORSAL FOREARM
LOCATION DETAILED: LEFT DISTAL DORSAL FOREARM
LOCATION DETAILED: LEFT ULNAR DORSAL HAND
LOCATION DETAILED: LEFT INFERIOR VERMILION LIP
LOCATION DETAILED: RIGHT PROXIMAL RADIAL DORSAL FOREARM
LOCATION DETAILED: RIGHT DISTAL DORSAL FOREARM
LOCATION DETAILED: LEFT SUPERIOR HELIX
LOCATION DETAILED: SUPERIOR THORACIC SPINE
LOCATION DETAILED: LEFT PROXIMAL RADIAL DORSAL FOREARM
LOCATION DETAILED: LEFT MEDIAL SUPERIOR CHEST
LOCATION DETAILED: RIGHT RADIAL DORSAL HAND
LOCATION DETAILED: RIGHT DORSAL WRIST
LOCATION DETAILED: LEFT PROXIMAL DORSAL FOREARM

## 2022-04-25 NOTE — PROCEDURE: LIQUID NITROGEN
Show Applicator Variable?: Yes
Duration Of Freeze Thaw-Cycle (Seconds): 2
Render Note In Bullet Format When Appropriate: No
Detail Level: Detailed
Number Of Freeze-Thaw Cycles: 2 freeze-thaw cycles
Post-Care Instructions: I reviewed with the patient in detail post-care instructions. Patient is to wear sunprotection, and avoid picking at any of the treated lesions. Pt may apply Vaseline to crusted or scabbing areas.
Consent: The patient's consent was obtained including but not limited to risks of crusting, scabbing, blistering, scarring, darker or lighter pigmentary change, recurrence, incomplete removal and infection.

## 2022-05-25 ENCOUNTER — APPOINTMENT (RX ONLY)
Dept: URBAN - METROPOLITAN AREA CLINIC 36 | Facility: CLINIC | Age: 72
Setting detail: DERMATOLOGY
End: 2022-05-25

## 2022-05-25 DIAGNOSIS — L57.0 ACTINIC KERATOSIS: ICD-10-CM

## 2022-05-25 PROCEDURE — 96567 PDT DSTR PRMLG LES SKN: CPT

## 2022-05-25 PROCEDURE — ? PDT: RED

## 2022-05-25 PROCEDURE — ? PHOTODYNAMIC THERAPY COUNSELING

## 2022-05-25 ASSESSMENT — LOCATION SIMPLE DESCRIPTION DERM: LOCATION SIMPLE: RIGHT FOREARM

## 2022-05-25 ASSESSMENT — LOCATION DETAILED DESCRIPTION DERM: LOCATION DETAILED: RIGHT PROXIMAL DORSAL FOREARM

## 2022-05-25 ASSESSMENT — LOCATION ZONE DERM: LOCATION ZONE: ARM

## 2022-05-25 NOTE — PROCEDURE: PDT: RED
Post-Care Instructions: I reviewed with the patient in detail post-care instructions. Patient is to avoid sunlight for the next 2 days, and wear sun protection. Patients may expect sunburn like redness, discomfort and scabbing.
Application Method: under occlusion
Lot # (Optional): [id number]
Total Number Of Aks Treated (Optional To Report): 0
Who Performed The Pdt?: Performed by Nurse, MA or Aesthetician ([id number])
Expiration Date (Optional): 07.2023
Detail Level: Zone
Debridement Text (Will Only Render In Visit Note If You Select Debridement Option Under Who Performed The Pdt Field): Prior to application of the photodynamic medication the hyperkeratotic lesions were curetted to make them more amenable to therapy.
Incubation Time (Set To 00:00:00 If Not Wanted): 03:00
Number Of Kerasticks/Tubes Of Metvixia/Tubes Of Ameluz Used: 1
Illumination Time: 7 min 7 sec
Consent: Written consent obtained.  The risks were reviewed with the patient including but not limited to: pigmentary changes, pain, blistering, scabbing, redness, and the remote possibility of scarring.
Anesthesia Type: 0.5% lidocaine with 1:200,000 epinephrine
Photosensitizer: Ameluz

## 2022-05-25 NOTE — HPI: PHOTODYNAMIC THERAPY (PDT)
Have You Had Previous Treatments With Pdt Before?: has not had previous treatments
When Outside In The Sun, Do You...: never burns, always tans

## 2022-05-25 NOTE — PROCEDURE: MIPS QUALITY
Detail Level: Detailed
Quality 226: Preventive Care And Screening: Tobacco Use: Screening And Cessation Intervention: Patient screened for tobacco use and is an ex/non-smoker
Quality 111:Pneumonia Vaccination Status For Older Adults: Pneumococcal vaccine administered on or after patient’s 60th birthday and before the end of the measurement period
Quality 130: Documentation Of Current Medications In The Medical Record: Current Medications Documented
Quality 431: Preventive Care And Screening: Unhealthy Alcohol Use - Screening: Patient not identified as an unhealthy alcohol user when screened for unhealthy alcohol use using a systematic screening method

## 2022-10-12 ENCOUNTER — APPOINTMENT (RX ONLY)
Dept: URBAN - NONMETROPOLITAN AREA CLINIC 1 | Facility: CLINIC | Age: 72
Setting detail: DERMATOLOGY
End: 2022-10-12

## 2022-10-12 DIAGNOSIS — L57.0 ACTINIC KERATOSIS: ICD-10-CM | Status: IMPROVED

## 2022-10-12 DIAGNOSIS — L85.3 XEROSIS CUTIS: ICD-10-CM

## 2022-10-12 PROCEDURE — 99212 OFFICE O/P EST SF 10 MIN: CPT | Mod: 25

## 2022-10-12 PROCEDURE — 17000 DESTRUCT PREMALG LESION: CPT

## 2022-10-12 PROCEDURE — ? COUNSELING

## 2022-10-12 PROCEDURE — ? LIQUID NITROGEN

## 2022-10-12 PROCEDURE — ? ADDITIONAL NOTES

## 2022-10-12 PROCEDURE — 17003 DESTRUCT PREMALG LES 2-14: CPT

## 2022-10-12 ASSESSMENT — LOCATION SIMPLE DESCRIPTION DERM
LOCATION SIMPLE: RIGHT HAND
LOCATION SIMPLE: RIGHT PRETIBIAL REGION
LOCATION SIMPLE: LEFT PRETIBIAL REGION
LOCATION SIMPLE: RIGHT FOREARM
LOCATION SIMPLE: LEFT WRIST
LOCATION SIMPLE: LEFT FOREARM

## 2022-10-12 ASSESSMENT — LOCATION DETAILED DESCRIPTION DERM
LOCATION DETAILED: RIGHT PROXIMAL PRETIBIAL REGION
LOCATION DETAILED: LEFT PROXIMAL DORSAL FOREARM
LOCATION DETAILED: RIGHT DISTAL DORSAL FOREARM
LOCATION DETAILED: LEFT DISTAL DORSAL FOREARM
LOCATION DETAILED: RIGHT RADIAL DORSAL HAND
LOCATION DETAILED: RIGHT PROXIMAL DORSAL FOREARM
LOCATION DETAILED: LEFT DORSAL WRIST
LOCATION DETAILED: LEFT PROXIMAL PRETIBIAL REGION

## 2022-10-12 ASSESSMENT — LOCATION ZONE DERM
LOCATION ZONE: ARM
LOCATION ZONE: LEG
LOCATION ZONE: HAND

## 2022-10-12 NOTE — PROCEDURE: LIQUID NITROGEN
Show Applicator Variable?: Yes
Render Note In Bullet Format When Appropriate: No
Duration Of Freeze Thaw-Cycle (Seconds): 2
Post-Care Instructions: I reviewed with the patient in detail post-care instructions. Patient is to wear sunprotection, and avoid picking at any of the treated lesions. Pt may apply Vaseline to crusted or scabbing areas.
Number Of Freeze-Thaw Cycles: 2 freeze-thaw cycles
Detail Level: Detailed
Consent: The patient's consent was obtained including but not limited to risks of crusting, scabbing, blistering, scarring, darker or lighter pigmentary change, recurrence, incomplete removal and infection.

## 2022-10-12 NOTE — PROCEDURE: MIPS QUALITY
Quality 226: Preventive Care And Screening: Tobacco Use: Screening And Cessation Intervention: Tobacco Screening not Performed for Medical Reasons
Quality 111:Pneumonia Vaccination Status For Older Adults: Pneumococcal vaccine (PPSV23) was not administered on or after patient’s 60th birthday and before the end of the measurement period, reason not otherwise specified
Detail Level: Detailed
Quality 130: Documentation Of Current Medications In The Medical Record: Current Medications Documented

## 2022-10-12 NOTE — PROCEDURE: ADDITIONAL NOTES
Detail Level: Simple
Additional Notes: PDT reaction was normal. Patient had a good outcome. Improvement noted.  Pt to do 2nd PDT this winter.
Render Risk Assessment In Note?: no

## 2023-05-08 ENCOUNTER — APPOINTMENT (RX ONLY)
Dept: URBAN - NONMETROPOLITAN AREA CLINIC 1 | Facility: CLINIC | Age: 73
Setting detail: DERMATOLOGY
End: 2023-05-08

## 2023-05-08 DIAGNOSIS — L57.0 ACTINIC KERATOSIS: ICD-10-CM

## 2023-05-08 DIAGNOSIS — L82.1 OTHER SEBORRHEIC KERATOSIS: ICD-10-CM

## 2023-05-08 DIAGNOSIS — L81.4 OTHER MELANIN HYPERPIGMENTATION: ICD-10-CM

## 2023-05-08 DIAGNOSIS — D22 MELANOCYTIC NEVI: ICD-10-CM

## 2023-05-08 DIAGNOSIS — Z71.89 OTHER SPECIFIED COUNSELING: ICD-10-CM

## 2023-05-08 PROBLEM — D22.5 MELANOCYTIC NEVI OF TRUNK: Status: ACTIVE | Noted: 2023-05-08

## 2023-05-08 PROCEDURE — ? SUNSCREEN RECOMMENDATIONS

## 2023-05-08 PROCEDURE — 99213 OFFICE O/P EST LOW 20 MIN: CPT | Mod: 25

## 2023-05-08 PROCEDURE — 17003 DESTRUCT PREMALG LES 2-14: CPT

## 2023-05-08 PROCEDURE — ? LIQUID NITROGEN

## 2023-05-08 PROCEDURE — 17000 DESTRUCT PREMALG LESION: CPT

## 2023-05-08 PROCEDURE — ? COUNSELING

## 2023-05-08 ASSESSMENT — LOCATION ZONE DERM
LOCATION ZONE: ARM
LOCATION ZONE: FACE
LOCATION ZONE: HAND
LOCATION ZONE: TRUNK

## 2023-05-08 ASSESSMENT — LOCATION DETAILED DESCRIPTION DERM
LOCATION DETAILED: RIGHT VENTRAL PROXIMAL FOREARM
LOCATION DETAILED: SUPERIOR THORACIC SPINE
LOCATION DETAILED: INFERIOR MID FOREHEAD
LOCATION DETAILED: LEFT MID TEMPLE
LOCATION DETAILED: RIGHT DISTAL DORSAL FOREARM
LOCATION DETAILED: RIGHT PROXIMAL DORSAL FOREARM
LOCATION DETAILED: RIGHT ULNAR DORSAL HAND
LOCATION DETAILED: LEFT INFERIOR MEDIAL UPPER BACK

## 2023-05-08 ASSESSMENT — LOCATION SIMPLE DESCRIPTION DERM
LOCATION SIMPLE: RIGHT FOREARM
LOCATION SIMPLE: LEFT UPPER BACK
LOCATION SIMPLE: LEFT TEMPLE
LOCATION SIMPLE: RIGHT HAND
LOCATION SIMPLE: UPPER BACK
LOCATION SIMPLE: INFERIOR FOREHEAD

## 2023-05-08 NOTE — PROCEDURE: LIQUID NITROGEN
Detail Level: Detailed
Render Post-Care Instructions In Note?: no
Consent: The patient's consent was obtained including but not limited to risks of crusting, scabbing, blistering, scarring, darker or lighter pigmentary change, recurrence, incomplete removal and infection.
Show Aperture Variable?: Yes
Number Of Freeze-Thaw Cycles: 2 freeze-thaw cycles
Duration Of Freeze Thaw-Cycle (Seconds): 2
Post-Care Instructions: I reviewed with the patient in detail post-care instructions. Patient is to wear sunprotection, and avoid picking at any of the treated lesions. Pt may apply Vaseline to crusted or scabbing areas.